# Patient Record
Sex: MALE | Race: WHITE
[De-identification: names, ages, dates, MRNs, and addresses within clinical notes are randomized per-mention and may not be internally consistent; named-entity substitution may affect disease eponyms.]

---

## 2019-08-02 ENCOUNTER — HOSPITAL ENCOUNTER (EMERGENCY)
Dept: HOSPITAL 25 - UCCORT | Age: 34
Discharge: HOME | End: 2019-08-02
Payer: COMMERCIAL

## 2019-08-02 DIAGNOSIS — Z88.1: ICD-10-CM

## 2019-08-02 DIAGNOSIS — Z88.0: ICD-10-CM

## 2019-08-02 DIAGNOSIS — A64: Primary | ICD-10-CM

## 2019-08-02 PROCEDURE — 87591 N.GONORRHOEAE DNA AMP PROB: CPT

## 2019-08-02 PROCEDURE — 96372 THER/PROPH/DIAG INJ SC/IM: CPT

## 2019-08-02 PROCEDURE — 87491 CHLMYD TRACH DNA AMP PROBE: CPT

## 2019-08-02 PROCEDURE — G0463 HOSPITAL OUTPT CLINIC VISIT: HCPCS

## 2019-08-02 PROCEDURE — 99202 OFFICE O/P NEW SF 15 MIN: CPT

## 2019-08-02 NOTE — UC
Complaint Male HPI





- HPI Summary


HPI Summary: 


34 year old  male presents with penile discharge after 

unprotected sex within the past week. He states he was with two different 

partners.


+dysuria


no flank pain nor fever








- History of Current Complaint


Chief Complaint: [facility]


Stated Complaint: COLD SXS


Time Seen by Provider: 08/02/19 22:00


Hx Obtained From: Patient


Onset/Duration: Sudden Onset - today


Pain Intensity: 0


Location: Penis - discharge


Character: Burning


Aggravating Factor(s): Voiding


Associated Signs And Symptoms: Positive: Negative


Prior STD Hx: yes





- Risk Factors


Testicular Torsion: Negative





- Allergies/Home Medications


Allergies/Adverse Reactions: 


 Allergies











Allergy/AdvReac Type Severity Reaction Status Date / Time


 


erythromycin base Allergy  Unknown Verified 08/02/19 22:05





[From Pediazole]   Reaction  





   Details  


 


loratadine Allergy  Unknown Verified 08/02/19 22:05





   Reaction  





   Details  


 


Penicillins Allergy  Itching Verified 08/02/19 22:05


 


Sulfa (Sulfonamide Allergy  Unknown Verified 08/02/19 22:05





Antibiotics)   Reaction  





   Details  


 


sulfisoxazole Allergy  Unknown Verified 08/02/19 22:05





[From Pediazole]   Reaction  





   Details  


 


CITROCEL Allergy Unknown Unknown Uncoded 10/31/12 22:22





   Reaction  





   Details  














PMH/Surg Hx/FS Hx/Imm Hx


Previously Healthy: Yes





- Surgical History


Surgical History: Yes


Surgery Procedure, Year, and Place: LEFT KNEE ACL,MCL,MINISCUS.SST FUSIONS LEFT 

AND RIGHT WRIST





- Family History


Known Family History: Positive: Non-Contributory





- Social History


Alcohol Use: None


Substance Use Type: None


Smoking Status (MU): Never Smoked Tobacco





Review of Systems


All Other Systems Reviewed And Are Negative: Yes


Constitutional: Positive: Negative


Skin: Positive: Negative


Eyes: Positive: Negative


ENT: Positive: Negative


Respiratory: Positive: Negative


Cardiovascular: Positive: Negative


Gastrointestinal: Positive: Negative


Genitourinary: Positive: Dysuria, Other - penile discharge


Neurovascular: Positive: Negative


Musculoskeletal: Positive: Negative


Neurological: Positive: Negative


Psychological: Positive: Negative


Is Patient Immunocompromised?: No





Physical Exam


Triage Information Reviewed: Yes


Appearance: Well-Appearing


Vital Signs: 


 Initial Vital Signs











Temp  97.4 F   08/02/19 21:53


 


Pulse  56   08/02/19 21:53


 


Resp  16   08/02/19 21:53


 


BP  125/77   08/02/19 21:53


 


Pulse Ox  100   08/02/19 21:53











Vital Signs Reviewed: Yes


Eye Exam: Normal


Eyes: Positive: Conjunctiva Clear


ENT: Positive: Normal ENT inspection


Neck: Positive: Supple, Nontender, No Lymphadenopathy


Respiratory: Positive: Lungs clear


Cardiovascular: Positive: RRR, No Murmur


Abdomen Description: Positive: Nontender, Soft


Male Genital Exam: Positive: Urethral Discharge - purulent.  Negative: Lesions


Musculoskeletal Exam: Normal


Neurological Exam: Normal


Skin Exam: Normal





 Complaint Male Course/Dx





- Course


Course Of Treatment: 





States he has tolerated rocephin and azithromycin in the past when treated. 

Rocephin 250mg IM and azithromycin 1000mg po given once here at Urgent Care. 





- Differential Dx/Diagnosis


Provider Diagnosis: 


 Sexually transmitted disease (STD)








Discharge





- Sign-Out/Discharge


Documenting (check all that apply): Patient Departure


All imaging exams completed and their final reports reviewed: No Studies





- Discharge Plan


Condition: Stable


Disposition: HOME


Patient Education Materials:  Sexually Transmitted Diseases (ED)


Referrals: 


No Primary Care Phys,NOPCP [Primary Care Provider] - 


Additional Instructions: 


It is important to notify all your sexual partners that you are being treated 

for a sexually transmitted disease. No sex for at least one week after 

treatment. Condom use and safe sexual practices are strongly recommended. 





- Billing Disposition and Condition


Condition: STABLE


Disposition: Home